# Patient Record
Sex: MALE | URBAN - METROPOLITAN AREA
[De-identification: names, ages, dates, MRNs, and addresses within clinical notes are randomized per-mention and may not be internally consistent; named-entity substitution may affect disease eponyms.]

---

## 2019-05-28 DIAGNOSIS — N18.4 CHRONIC KIDNEY DISEASE, STAGE IV (SEVERE): Primary | ICD-10-CM

## 2019-05-28 DIAGNOSIS — N28.89 BILATERAL RENAL MASSES: ICD-10-CM

## 2019-05-30 ENCOUNTER — TELEPHONE (OUTPATIENT)
Dept: NEPHROLOGY | Facility: CLINIC | Age: 64
End: 2019-05-30

## 2019-05-30 ENCOUNTER — OFFICE VISIT (OUTPATIENT)
Dept: UROLOGY | Facility: CLINIC | Age: 64
End: 2019-05-30
Payer: MEDICARE

## 2019-05-30 VITALS
HEART RATE: 69 BPM | WEIGHT: 293 LBS | BODY MASS INDEX: 34.59 KG/M2 | HEIGHT: 77 IN | RESPIRATION RATE: 15 BRPM | DIASTOLIC BLOOD PRESSURE: 71 MMHG | SYSTOLIC BLOOD PRESSURE: 134 MMHG

## 2019-05-30 DIAGNOSIS — N28.89 BILATERAL RENAL MASSES: Primary | ICD-10-CM

## 2019-05-30 DIAGNOSIS — N18.5 CKD (CHRONIC KIDNEY DISEASE) STAGE 5, GFR LESS THAN 15 ML/MIN: ICD-10-CM

## 2019-05-30 DIAGNOSIS — J42 CHRONIC BRONCHITIS, UNSPECIFIED CHRONIC BRONCHITIS TYPE: ICD-10-CM

## 2019-05-30 DIAGNOSIS — Z79.4 DIABETES MELLITUS DUE TO UNDERLYING CONDITION WITH DIABETIC NEPHROPATHY, WITH LONG-TERM CURRENT USE OF INSULIN: ICD-10-CM

## 2019-05-30 DIAGNOSIS — I25.10 CORONARY ARTERY DISEASE INVOLVING NATIVE CORONARY ARTERY OF NATIVE HEART, ANGINA PRESENCE UNSPECIFIED: ICD-10-CM

## 2019-05-30 DIAGNOSIS — E08.21 DIABETES MELLITUS DUE TO UNDERLYING CONDITION WITH DIABETIC NEPHROPATHY, WITH LONG-TERM CURRENT USE OF INSULIN: ICD-10-CM

## 2019-05-30 DIAGNOSIS — I10 ESSENTIAL HYPERTENSION: ICD-10-CM

## 2019-05-30 PROBLEM — J44.9 COPD (CHRONIC OBSTRUCTIVE PULMONARY DISEASE): Status: ACTIVE | Noted: 2019-05-30

## 2019-05-30 PROBLEM — E11.9 DIABETES: Status: ACTIVE | Noted: 2019-05-30

## 2019-05-30 PROBLEM — J98.6 DIAPHRAGM PARALYSIS: Status: ACTIVE | Noted: 2019-05-30

## 2019-05-30 PROCEDURE — 99999 PR PBB SHADOW E&M-EST. PATIENT-LVL III: ICD-10-PCS | Mod: PBBFAC,,, | Performed by: UROLOGY

## 2019-05-30 PROCEDURE — 99999 PR PBB SHADOW E&M-EST. PATIENT-LVL III: CPT | Mod: PBBFAC,,, | Performed by: UROLOGY

## 2019-05-30 PROCEDURE — 99205 OFFICE O/P NEW HI 60 MIN: CPT | Mod: S$PBB,,, | Performed by: UROLOGY

## 2019-05-30 PROCEDURE — 99213 OFFICE O/P EST LOW 20 MIN: CPT | Mod: PBBFAC | Performed by: UROLOGY

## 2019-05-30 PROCEDURE — 99205 PR OFFICE/OUTPT VISIT, NEW, LEVL V, 60-74 MIN: ICD-10-PCS | Mod: S$PBB,,, | Performed by: UROLOGY

## 2019-05-30 RX ORDER — SUB-Q INSULIN DEVICE, 40 UNIT
EACH MISCELLANEOUS
COMMUNITY
Start: 2019-05-15

## 2019-05-30 RX ORDER — FLASH GLUCOSE SCANNING READER
EACH MISCELLANEOUS
Refills: 1 | COMMUNITY
Start: 2019-04-08

## 2019-05-30 RX ORDER — METOPROLOL TARTRATE 50 MG/1
50 TABLET ORAL 2 TIMES DAILY
Refills: 3 | COMMUNITY
Start: 2019-05-06

## 2019-05-30 RX ORDER — FLASH GLUCOSE SENSOR
KIT MISCELLANEOUS
Refills: 11 | COMMUNITY
Start: 2019-05-14

## 2019-05-30 RX ORDER — ALLOPURINOL 300 MG/1
300 TABLET ORAL DAILY
Refills: 3 | COMMUNITY
Start: 2019-04-01

## 2019-05-30 RX ORDER — ASPIRIN 325 MG
325 TABLET ORAL DAILY
COMMUNITY

## 2019-05-30 RX ORDER — FUROSEMIDE 40 MG/1
40 TABLET ORAL 2 TIMES DAILY
COMMUNITY

## 2019-05-30 RX ORDER — SIMVASTATIN 20 MG/1
20 TABLET, FILM COATED ORAL NIGHTLY
Refills: 5 | COMMUNITY
Start: 2019-05-06

## 2019-05-30 RX ORDER — ERGOCALCIFEROL 1.25 MG/1
50000 CAPSULE ORAL
COMMUNITY

## 2019-05-30 RX ORDER — PARICALCITOL 1 UG/1
1 CAPSULE, LIQUID FILLED ORAL DAILY
Refills: 3 | COMMUNITY
Start: 2019-04-01

## 2019-05-30 RX ORDER — PEN NEEDLE, DIABETIC 31 GX5/16"
NEEDLE, DISPOSABLE MISCELLANEOUS
Refills: 3 | COMMUNITY
Start: 2019-05-06

## 2019-05-30 RX ORDER — ALBUTEROL SULFATE 4 MG/1
4 TABLET, FILM COATED, EXTENDED RELEASE ORAL 2 TIMES DAILY
COMMUNITY

## 2019-05-30 RX ORDER — INSULIN ASPART 100 [IU]/ML
INJECTION, SOLUTION INTRAVENOUS; SUBCUTANEOUS
Refills: 2 | COMMUNITY
Start: 2019-02-21

## 2019-05-30 RX ORDER — MONTELUKAST SODIUM 10 MG/1
10 TABLET ORAL NIGHTLY
COMMUNITY

## 2019-05-30 NOTE — LETTER
May 30, 2019      Janice Mace MD  [address] [facility]  [place] [address]           [facility] [place]  [address]  [place] [address]  Phone: [phone]          Patient: Luis A Dover   MR Number: [id number]   YOB: 1955   Date of Visit: 5/30/2019       Dear Dr. Janice Mace:    Thank you for referring Luis A Dover to me for evaluation. Attached you will find relevant portions of my assessment and plan of care.    If you have questions, please do not hesitate to call me. I look forward to following Luis A Dover along with you.    Sincerely,    David Porter MD    Enclosure  CC:  No Recipients    If you would like to receive this communication electronically, please contact [email or web address] or [phone] to request more information on [employer] Link access.    For providers and/or their staff who would like to refer a patient to [facility], please contact us through our one-stop-shop provider referral line, [facility] , at [phone].    If you feel you have received this communication in error or would no longer like to receive these types of communications, please e-mail [email or web address]

## 2019-05-30 NOTE — PROGRESS NOTES
Clinic Note  5/30/2019      Subjective:         Chief Complaint:   HPI  Luis A Dover is a 64 y.o. male with multiple medical problems including CAD ( s/p coronary artery bypass graft), COPD, DM, Obesity and CKD 5 (recent creatinine 3.90. Recent MRI WO (5/22/2019) showed bilateral renal masses.Right posterior mid 3.5 cms, left lateral mid 5 cm.  Gross hematuria 1 month ago. Had CT done. Neither report or images are available. Minimal outside medical records.  Retired . Walks with cane.  Here today with his wife Elena. She is psych  at [employer].    Past Medical History:   Diagnosis Date    Allergy     COPD (chronic obstructive pulmonary disease)     Diabetes mellitus     Hypertension     Urinary tract infection      Family History   Problem Relation Age of Onset    No Known Problems Father     Diabetes Mother      Social History     Socioeconomic History    Marital status: Significant Other     Spouse name: Elena    Number of children: 0    Years of education: Not on file    Highest education level: Not on file   Occupational History    Not on file   Social Needs    Financial resource strain: Patient refused    Food insecurity:     Worry: Patient refused     Inability: Patient refused    Transportation needs:     Medical: Patient refused     Non-medical: Patient refused   Tobacco Use    Smoking status: Never Smoker   Substance and Sexual Activity    Alcohol use: Never     Frequency: Never    Drug use: Never    Sexual activity: Not Currently   Lifestyle    Physical activity:     Days per week: Not on file     Minutes per session: Patient refused    Stress: Not on file   Relationships    Social connections:     Talks on phone: Patient refused     Gets together: Patient refused     Attends [employer] service: Patient refused     Active member of club or organization: Patient refused     Attends meetings of clubs or organizations: Patient refused     Relationship status: Patient  "refused   Other Topics Concern    Not on file   Social History Narrative    Not on file     Past Surgical History:   Procedure Laterality Date    HERNIA REPAIR      paralyzed diaphragm from open heart surgery  2010    Quadruple Bypass  2010    WRIST FRACTURE SURGERY       Patient Active Problem List   Diagnosis    Diabetes    Hypertension    Diaphragm paralysis    Bilateral renal masses    CKD (chronic kidney disease) stage 5, GFR less than 15 ml/min    COPD (chronic obstructive pulmonary disease)    Coronary artery disease involving native coronary artery of native heart     Review of Systems   Constitutional: Negative for appetite change, chills, fatigue, fever and unexpected weight change.   HENT: Negative for nosebleeds.    Respiratory: Negative for shortness of breath and wheezing.    Cardiovascular: Negative for chest pain, palpitations and leg swelling.   Gastrointestinal: Negative for abdominal distention, abdominal pain, constipation, diarrhea, nausea and vomiting.   Genitourinary: Positive for hematuria. Negative for dysuria.   Musculoskeletal: Negative for arthralgias and back pain.   Skin: Negative for pallor.   Neurological: Negative for dizziness, seizures and syncope.   Hematological: Negative for adenopathy.   Psychiatric/Behavioral: Negative for dysphoric mood.         Objective:      /71   Pulse 69   Resp 15   Ht 6' 5" (1.956 m)   Wt 132.9 kg (293 lb)   BMI 34.74 kg/m²   Estimated body mass index is 34.74 kg/m² as calculated from the following:    Height as of this encounter: 6' 5" (1.956 m).    Weight as of this encounter: 132.9 kg (293 lb).  Physical Exam   Constitutional: He is oriented to person, place, and time. He appears well-developed and well-nourished. No distress.   HENT:   Head: Atraumatic.   Neck: No tracheal deviation present.   Cardiovascular: Normal rate.    Pulmonary/Chest: Effort normal. No respiratory distress. He has no wheezes.   Abdominal: Soft. Bowel " sounds are normal. He exhibits no distension and no mass. There is no tenderness. There is no rebound and no guarding.   Neurological: He is alert and oriented to person, place, and time.   Skin: Skin is warm and dry. He is not diaphoretic.     Psychiatric: He has a normal mood and affect. His behavior is normal. Judgment and thought content normal.         Assessment and Plan:           Problem List Items Addressed This Visit     Diabetes    Relevant Medications    NOVOLOG U-100 INSULIN ASPART 100 unit/mL injection    Hypertension    Bilateral renal masses - Primary    Relevant Orders    Comprehensive metabolic panel    CBC auto differential    CKD (chronic kidney disease) stage 5, GFR less than 15 ml/min    COPD (chronic obstructive pulmonary disease)    Coronary artery disease involving native coronary artery of native heart          Follow up:    Long discussion about management of SRMs. Discussed malignant vs benign histology.  Discussed surveillance and surveillance  protocol, biopsy, ablation techniques ( including cryoabalation and HFRA), and resection techniques including robotic and open partial.Also discussed open, lap and robotic nephrectomy. Discussed risks and complications of all procedures, risk of reoperation and risks of recurrence and need for additional therapy Answered questions and addressed concerns.   Discussed that with his baseline renal insufficiency he will be at significantly higher risk of not only surgical complication but in need of HD. With his other medical comorbidities he will be at increased risk as well.  Reviewed outside MRI images. Will get them scanned into the system as well as read.   Discussed biopsy, abalation, resection. Already scheduled for biopsy in [place].  Get outside records and CTscan images to assess masses and rule out fat content..  Letter to Brandon Mace..  I spent over 45 minutes with the patient. Over 50% of the visit was spent in counseling.     David  JOSHUA Porter

## 2019-06-25 ENCOUNTER — OFFICE VISIT (OUTPATIENT)
Dept: UROLOGY | Facility: CLINIC | Age: 64
End: 2019-06-25
Payer: MEDICARE

## 2019-06-25 VITALS
RESPIRATION RATE: 15 BRPM | HEIGHT: 77 IN | DIASTOLIC BLOOD PRESSURE: 65 MMHG | BODY MASS INDEX: 34.59 KG/M2 | WEIGHT: 293 LBS | SYSTOLIC BLOOD PRESSURE: 112 MMHG | HEART RATE: 69 BPM

## 2019-06-25 DIAGNOSIS — N28.89 BILATERAL RENAL MASSES: ICD-10-CM

## 2019-06-25 DIAGNOSIS — Z79.4 DIABETES MELLITUS DUE TO UNDERLYING CONDITION WITH HYPEROSMOLARITY WITHOUT COMA, WITH LONG-TERM CURRENT USE OF INSULIN: ICD-10-CM

## 2019-06-25 DIAGNOSIS — E08.00 DIABETES MELLITUS DUE TO UNDERLYING CONDITION WITH HYPEROSMOLARITY WITHOUT COMA, WITH LONG-TERM CURRENT USE OF INSULIN: ICD-10-CM

## 2019-06-25 DIAGNOSIS — J41.8 MIXED SIMPLE AND MUCOPURULENT CHRONIC BRONCHITIS: ICD-10-CM

## 2019-06-25 DIAGNOSIS — I25.10 CORONARY ARTERY DISEASE INVOLVING NATIVE CORONARY ARTERY OF NATIVE HEART, ANGINA PRESENCE UNSPECIFIED: ICD-10-CM

## 2019-06-25 DIAGNOSIS — C64.2 CANCER OF KIDNEY PARENCHYMA, LEFT: Primary | ICD-10-CM

## 2019-06-25 DIAGNOSIS — N18.5 CKD (CHRONIC KIDNEY DISEASE) STAGE 5, GFR LESS THAN 15 ML/MIN: ICD-10-CM

## 2019-06-25 PROCEDURE — 99214 PR OFFICE/OUTPT VISIT, EST, LEVL IV, 30-39 MIN: ICD-10-PCS | Mod: S$PBB,,, | Performed by: UROLOGY

## 2019-06-25 PROCEDURE — 99214 OFFICE O/P EST MOD 30 MIN: CPT | Mod: S$PBB,,, | Performed by: UROLOGY

## 2019-06-25 PROCEDURE — 99213 OFFICE O/P EST LOW 20 MIN: CPT | Mod: PBBFAC | Performed by: UROLOGY

## 2019-06-25 PROCEDURE — 99999 PR PBB SHADOW E&M-EST. PATIENT-LVL III: ICD-10-PCS | Mod: PBBFAC,,, | Performed by: UROLOGY

## 2019-06-25 PROCEDURE — 99999 PR PBB SHADOW E&M-EST. PATIENT-LVL III: CPT | Mod: PBBFAC,,, | Performed by: UROLOGY

## 2019-06-25 NOTE — PROGRESS NOTES
Clinic Note  6/25/2019      Subjective:         Chief Complaint:   HPI  Luis A Dover is a 64 y.o. male with multiple medical problems including CAD ( s/p coronary artery bypass graft), COPD, DM, Obesity and CKD 5 (recent creatinine 3.90. Recent MRI WO (5/22/2019) showed bilateral renal masses.Right posterior mid 3.5 cms, left lateral mid 5 cm.  Gross hematuria 1 month ago. Had CT done. Neither report or images are available. Minimal outside medical records.  Retired . Walks with cane.  Here today with his wife Elena. She is psych  at [employer].  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           No results found for: PSA, PSADIAG, PSATOTAL, PSAFREE, PSAFREEPCT   Past Medical History:   Diagnosis Date    Allergy     COPD (chronic obstructive pulmonary disease)     Diabetes mellitus     Hypertension     Urinary tract infection      Family History   Problem Relation Age of Onset    No Known Problems Father     Diabetes Mother      Social History     Socioeconomic History    Marital status:      Spouse name: Elena    Number of children: 0    Years of education: Not on file    Highest education level: Not on file   Occupational History    Not on file   Social Needs    Financial resource strain: Patient refused    Food insecurity:     Worry: Patient refused     Inability: Patient refused    Transportation needs:     Medical: Patient refused     Non-medical: Patient refused   Tobacco Use    Smoking status: Never Smoker   Substance and Sexual Activity    Alcohol use: Never     Frequency: Never    Drug use: Never    Sexual activity: Not Currently   Lifestyle    Physical activity:     Days per week: Not on file     Minutes per session: Patient refused    Stress: Not on file   Relationships    Social connections:     Talks on phone: Patient refused     Gets  "together: Patient refused     Attends [employer] service: Patient refused     Active member of club or organization: Patient refused     Attends meetings of clubs or organizations: Patient refused     Relationship status: Patient refused   Other Topics Concern    Not on file   Social History Narrative    Not on file     Past Surgical History:   Procedure Laterality Date    HERNIA REPAIR      paralyzed diaphragm from open heart surgery  2010    Quadruple Bypass  2010    WRIST FRACTURE SURGERY       Patient Active Problem List   Diagnosis    Diabetes    Hypertension    Diaphragm paralysis    Bilateral renal masses    CKD (chronic kidney disease) stage 5, GFR less than 15 ml/min    COPD (chronic obstructive pulmonary disease)    Coronary artery disease involving native coronary artery of native heart     Review of Systems      Objective:      /65   Pulse 69   Resp 15   Ht 6' 5" (1.956 m)   Wt 132.9 kg (293 lb)   BMI 34.74 kg/m²   Estimated body mass index is 34.74 kg/m² as calculated from the following:    Height as of this encounter: 6' 5" (1.956 m).    Weight as of this encounter: 132.9 kg (293 lb).  Physical Exam      Assessment and Plan:           Problem List Items Addressed This Visit     None          Follow up:   Patient with CKD 5 and multiple solid renal masses ( largest on left 5 cm). Biopsy consistent with papillary type 1.   Long discussion about management of SRMs. Discussed malignant vs benign histology.  Discussed surveillance and surveillance  protocol, biopsy, ablation techniques ( including cryoabalation and HFRA), and resection techniques including robotic and open partial.Also discussed open, lap and robotic nephrectomy. Discussed risks and complications of all procedures, risk of reoperation and risks of recurrence and need for additional therapy Answered questions and addressed concerns. Also discussed with his co-morbidities that surgical resection would be high risk and " likely lead to HD.  I spent 25 minutes with the patient of which more than half was spent in direct consultation with the patient in regards to our treatment and plan.       David Porter

## 2019-07-15 ENCOUNTER — TELEPHONE (OUTPATIENT)
Dept: NEPHROLOGY | Facility: CLINIC | Age: 64
End: 2019-07-15

## 2019-07-15 NOTE — TELEPHONE ENCOUNTER
Left message regarding a message from Dr. Daniel on 6/6/2019 to call pt. in one month and let him know that Dr. Daniel is concerned about his kidneys and that sometimes kidney disease does not give any symptoms until they completely fail. Will call him again to see if he wants an appointment.